# Patient Record
(demographics unavailable — no encounter records)

---

## 2025-02-04 NOTE — HISTORY OF PRESENT ILLNESS
[FreeTextEntry1] : Stage IIIC2 Ni+ grade 1 endometrial cancer.  MMR - normal.  RTLH, BSO, SLNM, staging 1/5/17  Adjuvant chemotherapy and radiation were recommended.  Several conversations with patient in person and by phone. Was extremely anxious about therapy, declined to see medical oncology. Had Rad Onc consult but declined treatment.   She chose instead to see an "alternative medicine" specialist.  Her current regimen consisted of drinking colloidal water, oxygenated water, wearing a "blood purifying" device on her wrist and eating an alkaline diet.   Still reports epigastric "lump" that is stable which she thinks is bigger. No abnormalities seen on 3/22 CT scan.   S/P Right TKR on 5/17/21 - recovering well  CT scans C/A/P on 2/3/22  are [facility].   Mammogram 2/2022 BIRADS 1.  Colonoscopy 5/2017  Hasn't had COVID vaccine. Lots of misconceptions about vaccine.

## 2025-02-04 NOTE — PHYSICAL EXAM
[Chaperone Present] : A chaperone was present in the examining room during all aspects of the physical examination [33505] : A chaperone was present during the pelvic exam. [FreeTextEntry2] : Funmi [Absent] : Adnexa(ae): Absent [Normal] : Recto-Vaginal Exam: Normal [de-identified] : well healed lsc scars [de-identified] : reduction scars [de-identified] : cuff intact [Fully active, able to carry on all pre-disease performance without restriction] : Status 0 - Fully active, able to carry on all pre-disease performance without restriction